# Patient Record
Sex: MALE | Race: WHITE | URBAN - METROPOLITAN AREA
[De-identification: names, ages, dates, MRNs, and addresses within clinical notes are randomized per-mention and may not be internally consistent; named-entity substitution may affect disease eponyms.]

---

## 2023-07-05 ENCOUNTER — OFFICE VISIT (OUTPATIENT)
Dept: PRIMARY CARE | Facility: CLINIC | Age: 63
End: 2023-07-05
Payer: COMMERCIAL

## 2023-07-05 ENCOUNTER — TELEPHONE (OUTPATIENT)
Dept: PRIMARY CARE | Facility: CLINIC | Age: 63
End: 2023-07-05

## 2023-07-05 VITALS
BODY MASS INDEX: 27.47 KG/M2 | HEIGHT: 67 IN | HEART RATE: 65 BPM | WEIGHT: 175 LBS | RESPIRATION RATE: 14 BRPM | DIASTOLIC BLOOD PRESSURE: 78 MMHG | SYSTOLIC BLOOD PRESSURE: 122 MMHG | OXYGEN SATURATION: 96 %

## 2023-07-05 DIAGNOSIS — N52.9 ERECTILE DYSFUNCTION, UNSPECIFIED ERECTILE DYSFUNCTION TYPE: ICD-10-CM

## 2023-07-05 DIAGNOSIS — E78.5 HYPERLIPIDEMIA, UNSPECIFIED HYPERLIPIDEMIA TYPE: ICD-10-CM

## 2023-07-05 DIAGNOSIS — E55.9 MILD VITAMIN D DEFICIENCY: ICD-10-CM

## 2023-07-05 DIAGNOSIS — G47.00 INSOMNIA, UNSPECIFIED TYPE: Primary | ICD-10-CM

## 2023-07-05 DIAGNOSIS — Z00.00 PHYSICAL EXAM: Primary | ICD-10-CM

## 2023-07-05 PROCEDURE — 99396 PREV VISIT EST AGE 40-64: CPT | Performed by: INTERNAL MEDICINE

## 2023-07-05 PROCEDURE — 1036F TOBACCO NON-USER: CPT | Performed by: INTERNAL MEDICINE

## 2023-07-05 RX ORDER — ZOLPIDEM TARTRATE 10 MG/1
10 TABLET ORAL NIGHTLY PRN
Qty: 6 TABLET | Refills: 0 | Status: SHIPPED | OUTPATIENT
Start: 2023-07-05 | End: 2023-07-11

## 2023-07-05 RX ORDER — SILDENAFIL 50 MG/1
50 TABLET, FILM COATED ORAL DAILY PRN
Qty: 12 TABLET | Refills: 3 | Status: SHIPPED | OUTPATIENT
Start: 2023-07-05 | End: 2024-07-04

## 2023-07-05 ASSESSMENT — PATIENT HEALTH QUESTIONNAIRE - PHQ9
1. LITTLE INTEREST OR PLEASURE IN DOING THINGS: NOT AT ALL
SUM OF ALL RESPONSES TO PHQ9 QUESTIONS 1 AND 2: 0
2. FEELING DOWN, DEPRESSED OR HOPELESS: NOT AT ALL

## 2023-07-05 ASSESSMENT — ENCOUNTER SYMPTOMS
MYALGIAS: 0
CHILLS: 0
CONSTIPATION: 0
DIAPHORESIS: 0
SHORTNESS OF BREATH: 0
FEVER: 0
NAUSEA: 0
JOINT SWELLING: 0
COUGH: 0
VOMITING: 0
DIARRHEA: 0

## 2023-07-05 NOTE — PATIENT INSTRUCTIONS
FASTING LABS ARE ORDERED FOR AFTER 7/28/2023    2.  SILDENAFIL TRIAL FOR ED, BEWARE OF THE RARE SIDE EFFECTS OF PRIAPISM OR COLOR VISION ABERRATION    3.  WILL LET ORTHOPEDICS XRAY THE ELBOW, BUT COMMON THINGS BEING COMMON, TRY TO LIMIT THE LENGTH OF YOUR PUSHUP TO AVOID FULL FLEXION AND EXTENSION TO TAKE PRESSURE OFF OF WHERE THE TRICEPS INSERTS    4.  REGULAR EYE EXAMS ARE RECOMMENDED FOR YOU    5.  REGULAR COLONOSCOPIES AS YOU ARE DOING    6.  FOLLOW UP IN 1 YEAR OR AS NEEDED     7.  ZOLPIDEM 10 MG QHS PRN SLEEP #6 NO REFILL SENT IN

## 2023-07-05 NOTE — PROGRESS NOTES
"Subjective   Denis Deleon is a 62 y.o. male who presents for  FOLLOW UP   RIGHT ELBOW PAIN HAS APPT WITH ORTHO 7/26 ? SHOULD HE GET XRAY BEFORE HAND       HPI   WANT TO CONSIDER TRIAL VIAGRA    NOT TRIED BEFORE    RIGHT ELBOW PAIN FOR A FEW MONTHS.      Trouble sleeping before flights, want a few ambien to help slepp  Review of Systems   Constitutional:  Negative for chills, diaphoresis and fever.   Respiratory:  Negative for cough and shortness of breath.    Cardiovascular:  Negative for chest pain and leg swelling.   Gastrointestinal:  Negative for constipation, diarrhea, nausea and vomiting.   Genitourinary:         PER HPI     Musculoskeletal:  Negative for joint swelling and myalgias.       Objective   /78   Pulse 65   Resp 14   Ht 1.702 m (5' 7\")   Wt 79.4 kg (175 lb)   SpO2 96%   BMI 27.41 kg/m²     Physical Exam  Vitals reviewed.   Constitutional:       General: He is not in acute distress.     Appearance: He is not ill-appearing.   Cardiovascular:      Rate and Rhythm: Normal rate and regular rhythm.      Pulses: Normal pulses.      Heart sounds:      No gallop.   Pulmonary:      Breath sounds: Normal breath sounds. No wheezing, rhonchi or rales.   Abdominal:      General: Abdomen is flat. Bowel sounds are normal.      Palpations: Abdomen is soft.      Tenderness: There is no guarding or rebound.   Musculoskeletal:      Right lower leg: No edema.      Left lower leg: No edema.         Assessment/Plan   Problem List Items Addressed This Visit    None  Visit Diagnoses       Physical exam    -  Primary    Relevant Orders    Follow Up In [facility] - PCP - Established    Erectile dysfunction, unspecified erectile dysfunction type        Relevant Medications    sildenafil (Viagra) 50 mg tablet    Other Relevant Orders    Prostate Specific Antigen, Screen    Thyroid Stimulating Hormone    Hyperlipidemia, unspecified hyperlipidemia type        Relevant Orders    Comprehensive Metabolic " Panel    CBC    Lipid Panel    Mild vitamin D deficiency        Relevant Orders    Vitamin D, Total          Patient Instructions    FASTING LABS ARE ORDERED FOR AFTER 7/28/2023    2.  SILDENAFIL TRIAL FOR ED, BEWARE OF THE RARE SIDE EFFECTS OF PRIAPISM OR COLOR VISION ABERRATION    3.  WILL LET ORTHOPEDICS XRAY THE ELBOW, BUT COMMON THINGS BEING COMMON, TRY TO LIMIT THE LENGTH OF YOUR PUSHUP TO AVOID FULL FLEXION AND EXTENSION TO TAKE PRESSURE OFF OF WHERE THE TRICEPS INSERTS    4.  REGULAR EYE EXAMS ARE RECOMMENDED FOR YOU    5.  REGULAR COLONOSCOPIES AS YOU ARE DOING    6.  FOLLOW UP IN 1 YEAR OR AS NEEDED     7.  ZOLPIDEM 10 MG QHS PRN SLEEP #6 NO REFILL SENT IN

## 2023-12-28 ENCOUNTER — OFFICE VISIT (OUTPATIENT)
Dept: ORTHOPEDIC SURGERY | Facility: CLINIC | Age: 63
End: 2023-12-28
Payer: COMMERCIAL

## 2023-12-28 ENCOUNTER — ANCILLARY PROCEDURE (OUTPATIENT)
Dept: RADIOLOGY | Facility: CLINIC | Age: 63
End: 2023-12-28
Payer: COMMERCIAL

## 2023-12-28 DIAGNOSIS — M25.552 HIP PAIN, LEFT: ICD-10-CM

## 2023-12-28 DIAGNOSIS — M67.952 TENDINOPATHY OF LEFT GLUTEUS MEDIUS: Primary | ICD-10-CM

## 2023-12-28 PROCEDURE — 73502 X-RAY EXAM HIP UNI 2-3 VIEWS: CPT | Mod: LEFT SIDE | Performed by: RADIOLOGY

## 2023-12-28 PROCEDURE — 1036F TOBACCO NON-USER: CPT | Performed by: FAMILY MEDICINE

## 2023-12-28 PROCEDURE — 73502 X-RAY EXAM HIP UNI 2-3 VIEWS: CPT | Mod: LT

## 2023-12-28 PROCEDURE — 99204 OFFICE O/P NEW MOD 45 MIN: CPT | Performed by: FAMILY MEDICINE

## 2023-12-28 ASSESSMENT — PAIN DESCRIPTION - DESCRIPTORS: DESCRIPTORS: ACHING;SHARP

## 2023-12-28 ASSESSMENT — PAIN - FUNCTIONAL ASSESSMENT: PAIN_FUNCTIONAL_ASSESSMENT: 0-10

## 2023-12-28 ASSESSMENT — PAIN SCALES - GENERAL: PAINLEVEL_OUTOF10: 3

## 2023-12-28 NOTE — PROGRESS NOTES
NPV L hip pain  Subjective    Patient ID: Denis Deleon is a 63 y.o. male.    Chief Complaint: Pain of the Left Hip  Denis is a very pleasant 63-year-old gentleman who presents with left posterior lateral hip pain that is been going on for about 6 months.  It has been getting worse over the last few weeks.  Walking and weightbearing seem to make it worse.  He has taken some occasional Advil and rest seems to make it better.  It is not interfering with his day-to-day activities.  He has never had an issue with this hip, but has had ACL reconstructive surgery done in the 80s.  He currently rates his pain as a 3 out of 10 at its worst.    Objective   Musculoskeletal: Left hip-there is no deformity or asymmetry when compared to the opposite side.  He has some tenderness to palpation over the glutes minimus and gluteus medius tendons.  Not as much over the greater trochanter.  Logroll is negative.  Good range of motion of the hip without any significant discomfort, but does get some discomfort with end range of internal rotation.  Strength is 5 out of 5 in all directions.  Axial loading does not recreate any discomfort.  He has a very positive Lester's.    Image Results: 3 views of the left hip were reviewed and found no evidence of acute bony abnormalities.  No significant bone spurring.  Mild degenerative change of the femoral acetabular joint.      Assessment/Plan   Encounter Diagnoses:  Tendinopathy of left gluteus medius    Hip pain, left    Orders Placed This Encounter    XR hip left with pelvis when performed 2 or 3 views    Referral to Physical Therapy     Glue medius tendinitis on the left-we are going to start conservatively with formal physical therapy and oral medications.  Would like to give this a good 3 to 4 weeks and if he shows no improvement will consider steroid injection.  He will follow-up in 4 to 6 weeks, sooner if he has any setbacks.  All of his questions were answered he agrees with treatment  plan.

## 2024-01-31 ENCOUNTER — EVALUATION (OUTPATIENT)
Dept: PHYSICAL THERAPY | Facility: CLINIC | Age: 64
End: 2024-01-31
Payer: COMMERCIAL

## 2024-01-31 DIAGNOSIS — M67.952 TENDINOPATHY OF LEFT GLUTEUS MEDIUS: ICD-10-CM

## 2024-01-31 DIAGNOSIS — M25.552 LEFT HIP PAIN: Primary | ICD-10-CM

## 2024-01-31 PROCEDURE — 97161 PT EVAL LOW COMPLEX 20 MIN: CPT | Mod: GP | Performed by: PHYSICAL THERAPIST

## 2024-01-31 PROCEDURE — 97110 THERAPEUTIC EXERCISES: CPT | Mod: GP | Performed by: PHYSICAL THERAPIST

## 2024-01-31 ASSESSMENT — PAIN SCALES - GENERAL: PAINLEVEL_OUTOF10: 0 - NO PAIN

## 2024-01-31 ASSESSMENT — PAIN - FUNCTIONAL ASSESSMENT: PAIN_FUNCTIONAL_ASSESSMENT: 0-10

## 2024-01-31 NOTE — PROGRESS NOTES
Physical Therapy Evaluation    Patient Name: Denis Deleon  MRN: [id number]  Today's Date: 2/2/2024  Time Calculation  Start Time: 1052  Stop Time: 1131  Time Calculation (min): 39 min    Visit #: 1  Visits approved: Auth required.   Certification dates: NA    Precautions:  Precautions  Precautions Comment: None. Low fall risk.    Subjective/History  DOI: 6/1/23.  Mechanism of injury: Insidious.  Area of symptoms: Intermittent sharp or achy pain at left lat hip.  Pain Assessment: 0-10  Pain Score: 0 - No pain  Aggravating factors: Walk 15 min. Stairs.   Easing factors: Ibuprofen.  Red flags: None.  Occupation: Full-time .  Recreation/Hobbies/Sports: Would like to hike up to 2 hrs.  Living situation: Unremarkable. Does stairs safely.  Barriers to treatment: None.   Preferred language: English.    Objective Findings  Observation/gait: Mild bilat genu varum in standing. Gait: wnl.  PROM HF: wnl. Flex/adduction: wnl- mild lat hip pain. IR(90): ~10- stiff.   Muscle activation/Resisted testing: Gute Med: 4+/5- pain. Glute max: 5/5.  Special tests: FADIR: Lat hip pain.  RAMIREZ: Lat hip pain. SLR: ~70- HS stretch. No change with DF. Resisted SLR: Positive for post hip pain with resisted contralateral leg raise.  Palpation: TTP Left glute med, SIJ.    Treatment:   Therex: Glute med stretch in piriformis stretch position- HEP 30 sec, 4x/day. Clam shells: HEP 3x20, every other day. TA hold- HEP throughout day.  Assessment  Patient presents with palpation and resisted test findings consistent with glute med strain. Also has positive RAMIREZ and resisted SLS suggesting possible SIJ component- to be further assessed.     Plan  Physical therapy 1-2 times/week for 6-8 wks. Therapy may include the following: Therapeutic exercise, manual therapy, education/home program.     Goals: Walk 2 miles for exer (prepare for hiking) w/o pain. Up and down 2 flights of stairs for household ambulation w/o pain.  Glute med strength:  5/5.

## 2024-02-08 ENCOUNTER — TREATMENT (OUTPATIENT)
Dept: PHYSICAL THERAPY | Facility: CLINIC | Age: 64
End: 2024-02-08
Payer: COMMERCIAL

## 2024-02-08 DIAGNOSIS — M25.552 LEFT HIP PAIN: ICD-10-CM

## 2024-02-08 PROCEDURE — 97110 THERAPEUTIC EXERCISES: CPT | Mod: GP,CQ

## 2024-02-08 PROCEDURE — 97140 MANUAL THERAPY 1/> REGIONS: CPT | Mod: CQ,GP

## 2024-02-08 ASSESSMENT — PAIN - FUNCTIONAL ASSESSMENT: PAIN_FUNCTIONAL_ASSESSMENT: 0-10

## 2024-02-08 ASSESSMENT — PAIN SCALES - GENERAL: PAINLEVEL_OUTOF10: 2

## 2024-02-08 NOTE — PROGRESS NOTES
"      Physical Therapy Treatment    Patient Name: Denis Deleon  MRN: [id number]  Today's Date: 2/8/2024  Time Calculation  Start Time: 0957  Stop Time: 1042  Time Calculation (min): 45 min  Insurance   Visit #: 2 Visits approved: Auth required.   Certification dates: NA  Current Problem:  1. Left hip pain  Follow Up In Physical Therapy        Subjective:  I was able to hike over the weekend~ 1 hr.  I was sore afterward and discomfort went away with rest and with ibuprofen.  Pain:  Pain Assessment: 0-10  Pain Score: 2    Objective:  L hip SI joint hypo-mobility noted with lumbar flexion  Precautions  Precautions  Precautions Comment: None. Low fall risk.    Treatments:  Therapeutic Exercise 15742: Unit(s)-2  Supine hipflexor -daniel test position x 30sec R/L  Supine gluteus med stretch in fig 4 position x 30sec R/L  SL Clamshell x 10 R/L  Supine TA with March x 10  Supine TA with BKFO  Quadruped TA 1x10  Quadruped TA with Hip Heel Rock x 10 x 10\" hold  SLS with contralateral toe touch with TA x 10 R/L  TG -Leg Press Level 5 B LE 2x10 with isometric hip abd into red band  Standing Runner's stretch x 30sec R/L  Seated Hamstring stretch x 30sec R/L  Manual Therapy 46893: Unit(s)-1  MFR B LE Leg Pull  MFR L  Leg Pull  MFR-Cross Hand Release to L Lumbosacral area  MFR-Cross Hand Release to L Quadriceps, Sartorius and Hipflexor area  Sacral Release x 5  Prone hip Distraction with sacral stabilization on L LE  MFR Cross Hand Release to Lumbosacral region  Supine MET to LE R Hip Ext with L HipFlexion x 10 x 5\" hold  Assessment: Patient experienced discomfort in L hip and back at times during exercises that resolved with rest.   Plan: Continue to progress core strength and endurance.  "

## 2024-02-14 ENCOUNTER — TREATMENT (OUTPATIENT)
Dept: PHYSICAL THERAPY | Facility: CLINIC | Age: 64
End: 2024-02-14
Payer: COMMERCIAL

## 2024-02-14 DIAGNOSIS — M25.552 LEFT HIP PAIN: ICD-10-CM

## 2024-02-14 PROCEDURE — 97110 THERAPEUTIC EXERCISES: CPT | Mod: GP,CQ

## 2024-02-14 ASSESSMENT — PAIN - FUNCTIONAL ASSESSMENT: PAIN_FUNCTIONAL_ASSESSMENT: 0-10

## 2024-02-14 ASSESSMENT — PAIN SCALES - GENERAL: PAINLEVEL_OUTOF10: 4

## 2024-02-14 NOTE — PROGRESS NOTES
"      Physical Therapy Treatment    Patient Name: Denis Deleon  MRN: [id number]  Today's Date: 2/14/2024  Time Calculation  Start Time: 0920  Stop Time: 1004  Time Calculation (min): 44 min  Insurance   Visit #: 3 Visits approved: Auth required.   Certification dates: NA  Current Problem:  1. Left hip pain  Follow Up In Physical Therapy        Subjective:  I felt ok following last session with no increase of symptoms.  Pain:  Pain Assessment: 0-10  Pain Score: 4  Pain Type: Chronic pain  Pain Location: Back    Objective:  SI joint mobility -WNL  Precautions  Precautions  Precautions Comment: None. Low fall risk.    Treatments:  Therapeutic Exercise 75759: Unit(s)-2  Supine SKTC x 10\" x 3 R/L  Supine B UE shoulder flexion to promote T-spine ext 1x10 x 10\" hold  Supine Hipflexor stretch x 30 sec R/L   Supine PPT with March x 10  Supine LTR with contralateral UTR x 10  3 way bridge x5 each  SL Hip abd 1x10 R/L  SL Hip ER 1x10 R/L  SLS with contralateral hip abd/er isometric into wall x 5 x 5\" hold  Bent over raised table in trunk flexion with TA  Alt hip ext 1x10 each  Bird dog 1x10   Manual Therapy 04945: Unit(s)-  Manual Hip flexor stretch in prone with P/A glide to R/L PSIS  MFR-Cross Hand Release to L Lumbosacral area  Assessment: Patient experienced discomfort with prone position that resolved with change of position.  Plan: Continue to progress gluteal strength and function.  "

## 2024-02-21 ENCOUNTER — LAB (OUTPATIENT)
Dept: LAB | Facility: LAB | Age: 64
End: 2024-02-21
Payer: COMMERCIAL

## 2024-02-21 DIAGNOSIS — N52.9 ERECTILE DYSFUNCTION, UNSPECIFIED ERECTILE DYSFUNCTION TYPE: ICD-10-CM

## 2024-02-21 DIAGNOSIS — E78.5 HYPERLIPIDEMIA, UNSPECIFIED HYPERLIPIDEMIA TYPE: ICD-10-CM

## 2024-02-21 LAB
ALBUMIN SERPL BCP-MCNC: 4 G/DL (ref 3.4–5)
ALP SERPL-CCNC: 71 U/L (ref 33–136)
ALT SERPL W P-5'-P-CCNC: 15 U/L (ref 10–52)
ANION GAP SERPL CALC-SCNC: 13 MMOL/L (ref 10–20)
AST SERPL W P-5'-P-CCNC: 16 U/L (ref 9–39)
BILIRUB SERPL-MCNC: 0.7 MG/DL (ref 0–1.2)
BUN SERPL-MCNC: 12 MG/DL (ref 6–23)
CALCIUM SERPL-MCNC: 9 MG/DL (ref 8.6–10.3)
CHLORIDE SERPL-SCNC: 105 MMOL/L (ref 98–107)
CHOLEST SERPL-MCNC: 185 MG/DL (ref 0–199)
CHOLESTEROL/HDL RATIO: 3.8
CO2 SERPL-SCNC: 26 MMOL/L (ref 21–32)
CREAT SERPL-MCNC: 1.02 MG/DL (ref 0.5–1.3)
EGFRCR SERPLBLD CKD-EPI 2021: 83 ML/MIN/1.73M*2
ERYTHROCYTE [DISTWIDTH] IN BLOOD BY AUTOMATED COUNT: 12.9 % (ref 11.5–14.5)
GLUCOSE SERPL-MCNC: 100 MG/DL (ref 74–99)
HCT VFR BLD AUTO: 42.8 % (ref 41–52)
HDLC SERPL-MCNC: 48.8 MG/DL
HGB BLD-MCNC: 14.9 G/DL (ref 13.5–17.5)
LDLC SERPL CALC-MCNC: 117 MG/DL
MCH RBC QN AUTO: 31 PG (ref 26–34)
MCHC RBC AUTO-ENTMCNC: 34.8 G/DL (ref 32–36)
MCV RBC AUTO: 89 FL (ref 80–100)
NON HDL CHOLESTEROL: 136 MG/DL (ref 0–149)
NRBC BLD-RTO: 0 /100 WBCS (ref 0–0)
PLATELET # BLD AUTO: 276 X10*3/UL (ref 150–450)
POTASSIUM SERPL-SCNC: 4.1 MMOL/L (ref 3.5–5.3)
PROT SERPL-MCNC: 6.2 G/DL (ref 6.4–8.2)
PSA SERPL-MCNC: 0.53 NG/ML
RBC # BLD AUTO: 4.8 X10*6/UL (ref 4.5–5.9)
SODIUM SERPL-SCNC: 140 MMOL/L (ref 136–145)
TRIGL SERPL-MCNC: 95 MG/DL (ref 0–149)
TSH SERPL-ACNC: 1.34 MIU/L (ref 0.44–3.98)
VLDL: 19 MG/DL (ref 0–40)
WBC # BLD AUTO: 6 X10*3/UL (ref 4.4–11.3)

## 2024-02-21 PROCEDURE — 36415 COLL VENOUS BLD VENIPUNCTURE: CPT

## 2024-02-21 PROCEDURE — 85027 COMPLETE CBC AUTOMATED: CPT

## 2024-02-21 PROCEDURE — 80053 COMPREHEN METABOLIC PANEL: CPT

## 2024-02-21 PROCEDURE — 84153 ASSAY OF PSA TOTAL: CPT

## 2024-02-21 PROCEDURE — 84443 ASSAY THYROID STIM HORMONE: CPT

## 2024-02-21 PROCEDURE — 80061 LIPID PANEL: CPT

## 2024-03-01 ENCOUNTER — TREATMENT (OUTPATIENT)
Dept: PHYSICAL THERAPY | Facility: CLINIC | Age: 64
End: 2024-03-01
Payer: COMMERCIAL

## 2024-03-01 DIAGNOSIS — M25.552 LEFT HIP PAIN: ICD-10-CM

## 2024-03-01 PROCEDURE — 97140 MANUAL THERAPY 1/> REGIONS: CPT | Mod: GP | Performed by: PHYSICAL THERAPIST

## 2024-03-01 PROCEDURE — 97110 THERAPEUTIC EXERCISES: CPT | Mod: GP | Performed by: PHYSICAL THERAPIST

## 2024-03-01 NOTE — PROGRESS NOTES
Physical Therapy Follow-up    Patient Name: Denis Deleon  MRN: [id number]  Today's Date: 3/1/2024         Visit#: 4. 8 approved.  Cert dates: NA    Precautions: None.    Subjective: Hip pain ~ same. Walking and stairs ~ same. HEP going well.    Objective: PROM HF: wnl. Flex/adduction: wnl- lat hip pain.  AROM LS flex: wnl Ext: wnl- mild LBP.  IV- LS PA: stiff L3-5.    Treatment:   Therapeutic exercise: Lat hip stretch in hooklying, clam shells, Hooklying heel taps- all demo'd correctly.   Bent knee fall-outs- HEP 1 min, 2x/day.   Pelvic tilts- HEP 1 min 2x/day.   2-leg bridge.  Alternating 1-leg bridge- HEP 10x, 2x/day.     Manual therapy: IV PA, bilat transverse L3-5// Hip flex/add: no change.   IV- long hip, SIJ distraction// HF: wnl- decr pain.    Assessment: Decr sxs with hip distraction. Demo's HEP correctly.    Plan: Incr core stabilization exer.

## 2024-03-27 ENCOUNTER — TREATMENT (OUTPATIENT)
Dept: PHYSICAL THERAPY | Facility: CLINIC | Age: 64
End: 2024-03-27
Payer: COMMERCIAL

## 2024-03-27 DIAGNOSIS — M25.552 LEFT HIP PAIN: ICD-10-CM

## 2024-03-27 PROCEDURE — 97110 THERAPEUTIC EXERCISES: CPT | Mod: GP | Performed by: PHYSICAL THERAPIST

## 2024-03-27 PROCEDURE — 97140 MANUAL THERAPY 1/> REGIONS: CPT | Mod: GP | Performed by: PHYSICAL THERAPIST

## 2024-03-27 NOTE — PROGRESS NOTES
Physical Therapy Follow-up    Patient Name: Denis Deleon  MRN: [id number]  Today's Date: 3/27/2024         Visit#: 5. 8 approved.  Cert dates: NA    Precautions: None.    Subjective: Hip pain decreasing. Walking and stairs better, but has incr bilat LBP. HEP going well.    Objective: PROM HF: wnl. Flex/adduction: wnl- lat hip pain.  AROM LS flex: wnl Ext: wnl- mild LBP.  IV- LS PA: stiff L3-5.    Treatment:   Therapeutic exercise: Lat hip stretch in hooklying, clam shells, bent knee fall-outs, pelvic tilts, 2-leg bridge, alternating 1-leg bridge- all demo'd correctly.      Manual therapy: IV PA, bilat transverse L3-5// LS ext: wnl- decr pain.   IV- long hip, SIJ distraction//     Assessment: Decr sxs with rx. Demo's HEP correctly.    Plan: Incr core stabilization exer.

## 2024-04-01 ENCOUNTER — TREATMENT (OUTPATIENT)
Dept: PHYSICAL THERAPY | Facility: CLINIC | Age: 64
End: 2024-04-01
Payer: COMMERCIAL

## 2024-04-01 DIAGNOSIS — M25.552 LEFT HIP PAIN: ICD-10-CM

## 2024-04-01 PROCEDURE — 97140 MANUAL THERAPY 1/> REGIONS: CPT | Mod: GP | Performed by: PHYSICAL THERAPIST

## 2024-04-01 PROCEDURE — 97110 THERAPEUTIC EXERCISES: CPT | Mod: GP | Performed by: PHYSICAL THERAPIST

## 2024-04-01 NOTE — PROGRESS NOTES
Physical Therapy Follow-up    Patient Name: Denis Deleon  MRN: [id number]  Today's Date: 4/1/2024         Visit#: 6. 8 approved.  Cert dates: NA    Precautions: None.    Subjective: Hip pain decreasing. Walking and stairs better overall since last here. HEP going well.    Objective:   AROM LS flex: wnl Ext: wnl. SB: wnl bilat- mild pain with left SB.  IV- LS PA: stiff L3-5.    Treatment:   Therapeutic exercise: Lat hip stretch in hooklying, clam shells, bent knee fall-outs, pelvic tilts, 2-leg bridge, alternating 1-leg bridge- all demo'd correctly.   Sidelying leg lifts- HEP 20x, 2x/day.       Manual therapy: IV PA, bilat transverse L3-5. IV- long hip/SIJ distraction x ~5 min// LS SB: wnl- decr pain.     Assessment: Decr sxs with rx. Demo's HEP correctly.    Plan: Incr core stabilization exer.

## 2024-04-10 ENCOUNTER — TREATMENT (OUTPATIENT)
Dept: PHYSICAL THERAPY | Facility: CLINIC | Age: 64
End: 2024-04-10
Payer: COMMERCIAL

## 2024-04-10 DIAGNOSIS — M25.552 LEFT HIP PAIN: ICD-10-CM

## 2024-04-10 PROCEDURE — 97140 MANUAL THERAPY 1/> REGIONS: CPT | Mod: GP | Performed by: PHYSICAL THERAPIST

## 2024-04-10 PROCEDURE — 97110 THERAPEUTIC EXERCISES: CPT | Mod: GP | Performed by: PHYSICAL THERAPIST

## 2024-04-10 NOTE — PROGRESS NOTES
Physical Therapy Follow-up    Patient Name: Denis Deleon  MRN: [id number]  Today's Date: 4/10/2024         Visit#: 7. 8 approved.  Cert dates: NA    Precautions: None.    Subjective: Hip and back pain decreasing. Walking better- walked 90 min with incr LBP and hip pain for 2 days following. HEP going well.    Objective:   AROM LS flex: wnl Ext: wnl- mild LBP.   IV- LS PA: stiff L3-5.    Treatment:   Therapeutic exercise: Lat hip stretch in hooklying, clam shells, bent knee fall-outs, pelvic tilts, 2-leg bridge, alternating 1-leg bridge, sidelying leg lifts- all demo'd correctly.        Manual therapy: IV PA, bilat transverse L3-5. IV- long hip/SIJ distraction x ~5 min// LS ext: wnl- slight decr pain.     Assessment: Decr sxs with rx. Demo's HEP correctly.    Plan: Incr core stabilization exer.

## 2024-04-17 ENCOUNTER — TREATMENT (OUTPATIENT)
Dept: PHYSICAL THERAPY | Facility: CLINIC | Age: 64
End: 2024-04-17
Payer: COMMERCIAL

## 2024-04-17 DIAGNOSIS — M25.552 LEFT HIP PAIN: ICD-10-CM

## 2024-04-17 PROCEDURE — 97140 MANUAL THERAPY 1/> REGIONS: CPT | Mod: GP | Performed by: PHYSICAL THERAPIST

## 2024-04-17 PROCEDURE — 97110 THERAPEUTIC EXERCISES: CPT | Mod: GP | Performed by: PHYSICAL THERAPIST

## 2024-04-17 NOTE — PROGRESS NOTES
Physical Therapy Follow-up    Patient Name: Denis Deleon  MRN: [id number]  Today's Date: 4/17/2024         Visit#: 8. 8 approved.  Cert dates: NA    Precautions: None.    Subjective: Hip and back pain decreasing. Walking 2 miles with mild incr pain. Stairs wnl. HEP going well. Thinks he is ready for self management.    Objective:   AROM LS flex: wnl Ext: wnl. SB: wnl bilat- pain with left SB.   IV- LS PA: stiff L3-5.    Treatment:   Therapeutic exercise: Lat hip stretch in hooklying, clam shells, bent knee fall-outs, pelvic tilts, 2-leg bridge, alternating 1-leg bridge, sidelying leg lifts- all demo'd correctly.        Manual therapy: IV PA, bilat transverse L3-5. IV- long hip/SIJ distraction x ~5 min// LS ext: wnl- slight decr pain.     Assessment: all goals nearly achieved. Ready for self management.    Plan: Discharge with HEP.

## 2024-07-10 ENCOUNTER — APPOINTMENT (OUTPATIENT)
Dept: PRIMARY CARE | Facility: CLINIC | Age: 64
End: 2024-07-10
Payer: COMMERCIAL

## 2025-02-03 ENCOUNTER — TELEPHONE (OUTPATIENT)
Dept: PRIMARY CARE | Facility: CLINIC | Age: 65
End: 2025-02-03
Payer: COMMERCIAL

## 2025-02-03 NOTE — TELEPHONE ENCOUNTER
Pt has dx of Neuralgia Parasitica.  He has constant itching on outer right thigh.    Pt asking for a referral to see Neurologist Jagjit mcpherson.

## 2025-02-16 ASSESSMENT — PROMIS GLOBAL HEALTH SCALE
RATE_AVERAGE_PAIN: 3
RATE_AVERAGE_FATIGUE: MODERATE
RATE_SOCIAL_SATISFACTION: VERY GOOD
EMOTIONAL_PROBLEMS: RARELY
RATE_QUALITY_OF_LIFE: VERY GOOD
CARRYOUT_SOCIAL_ACTIVITIES: GOOD
RATE_PHYSICAL_HEALTH: GOOD
CARRYOUT_PHYSICAL_ACTIVITIES: MODERATELY
RATE_MENTAL_HEALTH: VERY GOOD
RATE_GENERAL_HEALTH: VERY GOOD

## 2025-02-17 DIAGNOSIS — G57.10 MERALGIA PARESTHETICA, UNSPECIFIED LATERALITY: Primary | ICD-10-CM

## 2025-02-20 ENCOUNTER — APPOINTMENT (OUTPATIENT)
Dept: PRIMARY CARE | Facility: CLINIC | Age: 65
End: 2025-02-20
Payer: COMMERCIAL

## 2025-02-20 VITALS
RESPIRATION RATE: 14 BRPM | DIASTOLIC BLOOD PRESSURE: 80 MMHG | BODY MASS INDEX: 25.58 KG/M2 | WEIGHT: 163 LBS | SYSTOLIC BLOOD PRESSURE: 110 MMHG | HEIGHT: 67 IN | HEART RATE: 70 BPM | OXYGEN SATURATION: 96 %

## 2025-02-20 DIAGNOSIS — E55.9 MILD VITAMIN D DEFICIENCY: ICD-10-CM

## 2025-02-20 DIAGNOSIS — N40.0 PROSTATISM: ICD-10-CM

## 2025-02-20 DIAGNOSIS — G47.00 INSOMNIA, UNSPECIFIED TYPE: ICD-10-CM

## 2025-02-20 DIAGNOSIS — D22.9 ATYPICAL MOLE: ICD-10-CM

## 2025-02-20 DIAGNOSIS — E78.5 DYSLIPIDEMIA: ICD-10-CM

## 2025-02-20 DIAGNOSIS — Z12.11 COLON CANCER SCREENING: ICD-10-CM

## 2025-02-20 DIAGNOSIS — Z00.00 ANNUAL PHYSICAL EXAM: Primary | ICD-10-CM

## 2025-02-20 PROCEDURE — 3008F BODY MASS INDEX DOCD: CPT | Performed by: INTERNAL MEDICINE

## 2025-02-20 PROCEDURE — 99396 PREV VISIT EST AGE 40-64: CPT | Performed by: INTERNAL MEDICINE

## 2025-02-20 PROCEDURE — 1036F TOBACCO NON-USER: CPT | Performed by: INTERNAL MEDICINE

## 2025-02-20 PROCEDURE — 99212 OFFICE O/P EST SF 10 MIN: CPT | Performed by: INTERNAL MEDICINE

## 2025-02-20 RX ORDER — TAMSULOSIN HYDROCHLORIDE 0.4 MG/1
0.4 CAPSULE ORAL DAILY
Qty: 30 CAPSULE | Refills: 11 | Status: SHIPPED | OUTPATIENT
Start: 2025-02-20 | End: 2026-02-20

## 2025-02-20 RX ORDER — ZOLPIDEM TARTRATE 10 MG/1
10 TABLET ORAL NIGHTLY PRN
Qty: 6 TABLET | Refills: 0 | Status: SHIPPED | OUTPATIENT
Start: 2025-02-20 | End: 2025-02-26

## 2025-02-20 ASSESSMENT — PATIENT HEALTH QUESTIONNAIRE - PHQ9
2. FEELING DOWN, DEPRESSED OR HOPELESS: NOT AT ALL
SUM OF ALL RESPONSES TO PHQ9 QUESTIONS 1 AND 2: 0
1. LITTLE INTEREST OR PLEASURE IN DOING THINGS: NOT AT ALL

## 2025-02-20 ASSESSMENT — ENCOUNTER SYMPTOMS
DIARRHEA: 0
CHILLS: 0
MYALGIAS: 0
COUGH: 0
JOINT SWELLING: 0
VOMITING: 0
FEVER: 0
SHORTNESS OF BREATH: 0
DIAPHORESIS: 0
CONSTIPATION: 0
NAUSEA: 0

## 2025-02-20 NOTE — PROGRESS NOTES
Subjective   Denis Deleon is a 64 y.o. male and is here for a comprehensive physical exam. The patient reports  EYE EXAM LAST A YEAR AGO.   .   HAS A BIOMETRICS FORM TO BE FILLED OUT.  WAS GIVEN AN ENZYME FROM [facility]IROPRACTER.   [place]TE .  EXERCISE 3 TIMES A WEEK WORK OUT.  SOME CARDIO, EXERCISES.  HAVING TO PUSH TO GET URINE TO FLOW AT NIGHT    Do you take any herbs or supplements that were not prescribed by a doctor? no  Are you taking calcium supplements? no  Are you taking aspirin daily? no      History:  URINARY FREQUENCY AND MILD ERECTIION ISSUES    Do you have pain that bothers you in your daily life? yes LOWER BACK, SAW CHIROPRACTER IN THE BACK, LOST WEIGHT AND DO STRETCHES  Review of Systems   Constitutional:  Negative for chills, diaphoresis and fever.   Respiratory:  Negative for cough and shortness of breath.    Cardiovascular:  Negative for chest pain and leg swelling.   Gastrointestinal:  Negative for constipation, diarrhea, nausea and vomiting.   Genitourinary:         PER HPI     Musculoskeletal:  Negative for joint swelling and myalgias.        Objective   Physical Exam  Vitals reviewed.   Constitutional:       General: He is not in acute distress.     Appearance: He is not ill-appearing.   Cardiovascular:      Rate and Rhythm: Normal rate and regular rhythm.      Pulses: Normal pulses.      Heart sounds:      No gallop.   Pulmonary:      Breath sounds: Normal breath sounds. No wheezing, rhonchi or rales.   Abdominal:      General: Abdomen is flat. Bowel sounds are normal.      Palpations: Abdomen is soft.      Tenderness: There is no guarding or rebound.   Musculoskeletal:      Right lower leg: No edema.      Left lower leg: No edema.   Skin:     General: Skin is warm and dry.      Findings: Lesion (SMALL 4-6 MM FLAT BICOLORED BROWN MACULE WITH VERY DARK SATELLITE LEFT MID BACK) present.         Assessment/Plan   Healthy male exam.      1. SEE PATIENT INSTRUCTION  2. Patient  Counseling:  --Nutrition: Stressed importance of moderation in sodium/caffeine intake, saturated fat and cholesterol, caloric balance, sufficient intake of fresh fruits, vegetables, fiber, calcium, iron, and 1 mg of folate supplement per day (for females capable of pregnancy).  --Discussed the issue of estrogen replacement, calcium supplement, and the daily use of baby aspirin.  --Exercise: Stressed the importance of regular exercise.   --Substance Abuse: Discussed cessation/primary prevention of tobacco, alcohol, or other drug use; driving or other dangerous activities under the influence; availability of treatment for abuse.    --Sexuality: Discussed sexually transmitted diseases, partner selection, use of condoms, avoidance of unintended pregnancy  and contraceptive alternatives.   --Injury prevention: Discussed safety belts, safety helmets, smoke detector, smoking near bedding or upholstery.   --Dental health: Discussed importance of regular tooth brushing, flossing, and dental visits.  --Immunizations reviewed.  --Discussed benefits of screening colonoscopy.  --After hours service discussed with patient  3. Discussed the patient's BMI with him.  The BMI is in the acceptable range.  4. Follow up in one year  Patient Instructions    RECOMMEND YOU GET TETANUS/PERTUSSIS IMMUNIZATION BOOSTER AT THE LOCAL PHARMACY    2.  FASTING LABS ARE ORDERED FOR YOU TO INCLUDE PROTEIN    3.  TRIAL OF TAMSULOSIN/FLOMAX NIGHTLY TO HELP EASE URINE FLOW    4.  REGULAR EYE EXAMS ARE RECOMMENDED FOR [facility]    5.  COLONOSCOPY IS ORDERED FOR YOU    6.  REFILL AMBIEN SENT IN FOR YOU FOR 6 DAYS    7.  DERMATOLOGY REFERRAL TO LOOK AT THE MOLE ON THE BACK    8.  FOLLOW UP YEARLY OR AS NEEDED.

## 2025-02-20 NOTE — PATIENT INSTRUCTIONS
RECOMMEND YOU GET TETANUS/PERTUSSIS IMMUNIZATION BOOSTER AT THE LOCAL PHARMACY    2.  FASTING LABS ARE ORDERED FOR YOU TO INCLUDE PROTEIN    3.  TRIAL OF TAMSULOSIN/FLOMAX NIGHTLY TO HELP EASE URINE FLOW    4.  REGULAR EYE EXAMS ARE RECOMMENDED FOR [facility]    5.  COLONOSCOPY IS ORDERED FOR YOU    6.  REFILL AMBIEN SENT IN FOR YOU FOR 6 DAYS    7.  DERMATOLOGY REFERRAL TO LOOK AT THE MOLE ON THE BACK    8.  FOLLOW UP YEARLY OR AS NEEDED.

## 2025-02-21 DIAGNOSIS — Z12.11 COLON CANCER SCREENING: Primary | ICD-10-CM

## 2025-02-22 LAB
25(OH)D3+25(OH)D2 SERPL-MCNC: 31 NG/ML (ref 30–100)
ALBUMIN SERPL-MCNC: 4.5 G/DL (ref 3.6–5.1)
ALP SERPL-CCNC: 63 U/L (ref 35–144)
ALT SERPL-CCNC: 15 U/L (ref 9–46)
ANION GAP SERPL CALCULATED.4IONS-SCNC: 7 MMOL/L (CALC) (ref 7–17)
AST SERPL-CCNC: 17 U/L (ref 10–35)
BILIRUB SERPL-MCNC: 0.8 MG/DL (ref 0.2–1.2)
BUN SERPL-MCNC: 17 MG/DL (ref 7–25)
CALCIUM SERPL-MCNC: 9.4 MG/DL (ref 8.6–10.3)
CHLORIDE SERPL-SCNC: 104 MMOL/L (ref 98–110)
CHOLEST SERPL-MCNC: 207 MG/DL
CHOLEST/HDLC SERPL: 3.2 (CALC)
CO2 SERPL-SCNC: 28 MMOL/L (ref 20–32)
CREAT SERPL-MCNC: 1.07 MG/DL (ref 0.7–1.35)
EGFRCR SERPLBLD CKD-EPI 2021: 77 ML/MIN/1.73M2
ERYTHROCYTE [DISTWIDTH] IN BLOOD BY AUTOMATED COUNT: 12.5 % (ref 11–15)
GLUCOSE SERPL-MCNC: 108 MG/DL (ref 65–99)
HCT VFR BLD AUTO: 44.2 % (ref 38.5–50)
HDLC SERPL-MCNC: 65 MG/DL
HGB BLD-MCNC: 14.7 G/DL (ref 13.2–17.1)
LDLC SERPL CALC-MCNC: 125 MG/DL (CALC)
MCH RBC QN AUTO: 30.4 PG (ref 27–33)
MCHC RBC AUTO-ENTMCNC: 33.3 G/DL (ref 32–36)
MCV RBC AUTO: 91.5 FL (ref 80–100)
NONHDLC SERPL-MCNC: 142 MG/DL (CALC)
PLATELET # BLD AUTO: 272 THOUSAND/UL (ref 140–400)
PMV BLD REES-ECKER: 9.5 FL (ref 7.5–12.5)
POTASSIUM SERPL-SCNC: 4.7 MMOL/L (ref 3.5–5.3)
PROT SERPL-MCNC: 6.4 G/DL (ref 6.1–8.1)
RBC # BLD AUTO: 4.83 MILLION/UL (ref 4.2–5.8)
SODIUM SERPL-SCNC: 139 MMOL/L (ref 135–146)
TRIGL SERPL-MCNC: 76 MG/DL
TSH SERPL-ACNC: 1.25 MIU/L (ref 0.4–4.5)
WBC # BLD AUTO: 4.5 THOUSAND/UL (ref 3.8–10.8)

## 2025-02-24 RX ORDER — SODIUM, POTASSIUM,MAG SULFATES 17.5-3.13G
SOLUTION, RECONSTITUTED, ORAL ORAL
Qty: 354 ML | Refills: 0 | Status: SHIPPED | OUTPATIENT
Start: 2025-02-24

## 2025-02-25 LAB
25(OH)D3+25(OH)D2 SERPL-MCNC: 31 NG/ML (ref 30–100)
ALBUMIN SERPL-MCNC: 4.5 G/DL (ref 3.6–5.1)
ALP SERPL-CCNC: 63 U/L (ref 35–144)
ALT SERPL-CCNC: 15 U/L (ref 9–46)
ANION GAP SERPL CALCULATED.4IONS-SCNC: 7 MMOL/L (CALC) (ref 7–17)
AST SERPL-CCNC: 17 U/L (ref 10–35)
BILIRUB SERPL-MCNC: 0.8 MG/DL (ref 0.2–1.2)
BUN SERPL-MCNC: 17 MG/DL (ref 7–25)
CALCIUM SERPL-MCNC: 9.4 MG/DL (ref 8.6–10.3)
CHLORIDE SERPL-SCNC: 104 MMOL/L (ref 98–110)
CHOLEST SERPL-MCNC: 207 MG/DL
CHOLEST/HDLC SERPL: 3.2 (CALC)
CO2 SERPL-SCNC: 28 MMOL/L (ref 20–32)
CREAT SERPL-MCNC: 1.07 MG/DL (ref 0.7–1.35)
EGFRCR SERPLBLD CKD-EPI 2021: 77 ML/MIN/1.73M2
ERYTHROCYTE [DISTWIDTH] IN BLOOD BY AUTOMATED COUNT: 12.5 % (ref 11–15)
GLUCOSE SERPL-MCNC: 108 MG/DL (ref 65–99)
HCT VFR BLD AUTO: 44.2 % (ref 38.5–50)
HDLC SERPL-MCNC: 65 MG/DL
HGB BLD-MCNC: 14.7 G/DL (ref 13.2–17.1)
LDLC SERPL CALC-MCNC: 125 MG/DL (CALC)
MCH RBC QN AUTO: 30.4 PG (ref 27–33)
MCHC RBC AUTO-ENTMCNC: 33.3 G/DL (ref 32–36)
MCV RBC AUTO: 91.5 FL (ref 80–100)
NONHDLC SERPL-MCNC: 142 MG/DL (CALC)
PLATELET # BLD AUTO: 272 THOUSAND/UL (ref 140–400)
PMV BLD REES-ECKER: 9.5 FL (ref 7.5–12.5)
POTASSIUM SERPL-SCNC: 4.7 MMOL/L (ref 3.5–5.3)
PROT SERPL-MCNC: 6.4 G/DL (ref 6.1–8.1)
PSA SERPL-MCNC: 0.44 NG/ML
RBC # BLD AUTO: 4.83 MILLION/UL (ref 4.2–5.8)
SODIUM SERPL-SCNC: 139 MMOL/L (ref 135–146)
TRIGL SERPL-MCNC: 76 MG/DL
TSH SERPL-ACNC: 1.25 MIU/L (ref 0.4–4.5)
WBC # BLD AUTO: 4.5 THOUSAND/UL (ref 3.8–10.8)

## 2025-03-18 ENCOUNTER — APPOINTMENT (OUTPATIENT)
Facility: CLINIC | Age: 65
End: 2025-03-18
Payer: COMMERCIAL

## 2025-03-18 VITALS
SYSTOLIC BLOOD PRESSURE: 118 MMHG | RESPIRATION RATE: 17 BRPM | HEIGHT: 67 IN | BODY MASS INDEX: 25.11 KG/M2 | DIASTOLIC BLOOD PRESSURE: 76 MMHG | WEIGHT: 160 LBS | HEART RATE: 64 BPM

## 2025-03-18 DIAGNOSIS — G57.10 MERALGIA PARESTHETICA, UNSPECIFIED LATERALITY: ICD-10-CM

## 2025-03-18 PROCEDURE — 99204 OFFICE O/P NEW MOD 45 MIN: CPT | Performed by: PAIN MEDICINE

## 2025-03-18 PROCEDURE — 1036F TOBACCO NON-USER: CPT | Performed by: PAIN MEDICINE

## 2025-03-18 PROCEDURE — 3008F BODY MASS INDEX DOCD: CPT | Performed by: PAIN MEDICINE

## 2025-03-18 ASSESSMENT — PAIN SCALES - GENERAL: PAINLEVEL_OUTOF10: 0-NO PAIN

## 2025-03-18 ASSESSMENT — PAIN - FUNCTIONAL ASSESSMENT: PAIN_FUNCTIONAL_ASSESSMENT: NO/DENIES PAIN

## 2025-03-18 NOTE — PROGRESS NOTES
Subjective   Patient ID: Denis Deleon is a 64 y.o. male with a past medical history of right-sided meralgia paresthetica    HPI:   Denis is a new patient here for evaluation of right-sided lateral thigh itchiness for the past 10 years.  He did not endorse any inciting event.  The itching is constant, he has tried physical therapy, gabapentin, capsaicin cream with only minimal relief.  The capsaicin cream relieves itchiness partially for 1 to 2 hours.  He has never tried injections for this nerve before.  It is very bothersome in his life it interferes with his ability to enjoy his ADLs.      Review of Systems   13-point ROS done and negative except for HPI.     Current Outpatient Medications   Medication Instructions    sodium,potassium,mag sulfates (Suprep) 17.5-3.13-1.6 gram solution Take one bottle twice as directed by the prep instructions    tamsulosin (FLOMAX) 0.4 mg, oral, Daily    zolpidem (AMBIEN) 10 mg, oral, Nightly PRN, PRN WITH TRAVEL       Past Medical History:   Diagnosis Date    Non-celiac gluten sensitivity     Gluten intolerance        No past surgical history on file.     No family history on file.     Allergies   Allergen Reactions    Gluten Diarrhea    Latex Unknown    Milk Containing Products (Dairy) Diarrhea        Objective     Vitals:    03/18/25 1304   BP: 118/76   Pulse: 64   Resp: 17        Physical Exam  General: NAD, well groomed, well nourished  Eyes: Non-icteric sclera, EOMI  Ears, Nose, Mouth, and Throat: External ears and nose appear to be without deformity or rash. No lesions or masses noted. Hearing is grossly intact.   Neck: Trachea midline  Respiratory: Nonlabored breathing   Cardiovascular: no peripheral edema   Skin: No rashes or open lesions/ulcers identified on skin.    Hip: No pain over greater trochanters. and Pain not reproduced with hip internal/external rotation.     Neurologic:   Cranial nerves grossly intact.   Strength 5/5 and symmetric plantar/dorsiflexion    Sensation: Normal to light touch throughout, pinprick  intact throughout.    Psychiatric: Alert, orientation to person, place, and time. Cooperative.    Imaging personally reviewed and independently interpreted:        Assessment/Plan   Denis is 54-year-old male with clinical picture consistent with right-sided meralgia paresthetica.  He has failed conservative management with physical therapy, gabapentin/capsaicin cream.  He is wishing to pursue interventional management.  Will trial right-sided lateral femoral cutaneous nerve block for diagnostic and/or therapeutic purposes.  If successful, could consider right-sided lateral femoral cutaneous nerve block under ultrasound guidance versus radiofrequency ablation in the future. If unsuccessful, will look for other pathologies.     Plan:  -In office right sided lateral femoral cutaneous nerve block with 0.25% percent bupivacaine  -Denis will call us with results later this afternoon.     The patient has failed treatment with : Physical therapy , three or more classes of medications, have significant limitations in their sleep quality due to the pain, have significant limitations of their quality of life due to the pain, have significant impairments of their activities of daily living (ADLs) due to the pain, and have significant impairments of their instrumental activities of daily living (IADLs) due to the pain    We discussed  the risks, benefits and alternatives of the procedure including but not limited to: , Neuritis/sunburn sensation, Lack of efficacy , Transiently worsening pain , Bleeding, Infection , and Nerve Damage    Follow up: As needed     The patient was invited to contact us back anytime with any questions or concerns and follow-up with us in the office as needed.     Diagnoses and all orders for this visit:  Meralgia paresthetica, unspecified laterality  -     Referral to Pain Medicine      This note was generated with the aid of dictation  software, there may be typos despite my attempts at proofreading.    Franky Louis, DO  Pain Fellow

## 2025-03-19 ENCOUNTER — ANESTHESIA EVENT (OUTPATIENT)
Dept: GASTROENTEROLOGY | Facility: EXTERNAL LOCATION | Age: 65
End: 2025-03-19

## 2025-03-20 ENCOUNTER — APPOINTMENT (OUTPATIENT)
Dept: PRIMARY CARE | Facility: CLINIC | Age: 65
End: 2025-03-20
Payer: COMMERCIAL

## 2025-03-25 ENCOUNTER — APPOINTMENT (OUTPATIENT)
Dept: GASTROENTEROLOGY | Facility: EXTERNAL LOCATION | Age: 65
End: 2025-03-25
Payer: COMMERCIAL

## 2025-04-01 ENCOUNTER — APPOINTMENT (OUTPATIENT)
Facility: CLINIC | Age: 65
End: 2025-04-01
Payer: COMMERCIAL

## 2025-04-01 VITALS
HEART RATE: 81 BPM | BODY MASS INDEX: 26.06 KG/M2 | DIASTOLIC BLOOD PRESSURE: 80 MMHG | WEIGHT: 166 LBS | HEIGHT: 67 IN | SYSTOLIC BLOOD PRESSURE: 150 MMHG

## 2025-04-01 DIAGNOSIS — G57.10 MERALGIA PARESTHETICA, UNSPECIFIED LATERALITY: Primary | ICD-10-CM

## 2025-04-01 PROCEDURE — 3008F BODY MASS INDEX DOCD: CPT | Performed by: PAIN MEDICINE

## 2025-04-01 PROCEDURE — 99214 OFFICE O/P EST MOD 30 MIN: CPT | Performed by: PAIN MEDICINE

## 2025-04-01 ASSESSMENT — PAIN SCALES - GENERAL: PAINLEVEL_OUTOF10: 3

## 2025-04-01 ASSESSMENT — PAIN - FUNCTIONAL ASSESSMENT: PAIN_FUNCTIONAL_ASSESSMENT: 0-10

## 2025-04-01 NOTE — PROGRESS NOTES
Subjective   Patient ID: Denis Deleon is a 64 y.o. male with a past medical history of R-sided meralgia paresthetica     HPI:   Mr. Deleon is a 64yM with a PMH significant for meralgia paresthetica presenting in clinic for repeat R lateral femoral cutaneous nerve block under ultrasound. Patient states he developed R leg itching in the R lateral femoral cutaneous nerve distribution approximately 12 years ago. Topical agents with the exception of topical capsaicin gel were ineffective; topical capsaicin is effective for several hours but must be reapplied multiple times per day so the patient is seeking additional treatment. Prior landmark injection of the R lateral femoral cutaneous nerve was ineffective and performed with a reduced concentration of bupivacaine (0.25%); patient states he has a known high tolerance to local anesthetics.    Classes of medications tried in the past: Topical agents    Review of Systems   13-point ROS done and negative except for HPI.     Current Outpatient Medications   Medication Instructions    sodium,potassium,mag sulfates (Suprep) 17.5-3.13-1.6 gram solution Take one bottle twice as directed by the prep instructions    tamsulosin (FLOMAX) 0.4 mg, oral, Daily    zolpidem (AMBIEN) 10 mg, oral, Nightly PRN, PRN WITH TRAVEL       Past Medical History:   Diagnosis Date    Non-celiac gluten sensitivity     Gluten intolerance        No past surgical history on file.     No family history on file.     Allergies   Allergen Reactions    Gluten Diarrhea    Latex Unknown    Milk Containing Products (Dairy) Diarrhea        Objective     Vitals:    04/01/25 1300   BP: 150/80   Pulse: 81        Physical Exam  General: NAD, well groomed, well nourished  Eyes: Non-icteric sclera, EOMI  Ears, Nose, Mouth, and Throat: External ears and nose appear to be without deformity or rash. No lesions or masses noted. Hearing is grossly intact.   Neck: Trachea midline  Respiratory: Nonlabored breathing    Cardiovascular: trace peripheral edema   Skin: No rashes or open lesions/ulcers identified on skin.    Neurologic:   Cranial nerves grossly intact.   Strength 5/5 and symmetric plantar/dorsiflexion   Sensation: Normal to light touch throughout, pinprick intact throughout.  DTRs:normal and symmetric throughout  Braun: absent  Clonus: absent    Psychiatric: Alert, orientation to person, place, and time. Cooperative.    Imaging personally reviewed and independently interpreted: none    Assessment/Plan   Mr. Morin is a 64yM presenting with R meralgia paresthetica manifesting as itchiness. Based on lack of prior response to in-office injection, attempting again with ultrasound guidance will result in a higher probability of relief. Patient's response to topical capsaicin indicates the paresthesia is mediated by nerve function, ultimately making cutaneous nerve block the optimal choice regardless of guidance modality.     Plan:  -In office R lateral femoral cutaneous nerve block under ultrasound guidance.   - The patient's R ASIS was identified by palpation and the approximate location of the lateral femoral cutaneous nerve was marked. Under ultrasound, the location was scanned, taking care to identify the great vessels and lateral femoral cutaneous nerve. Once the nerve was identified, the area was prepped with chlorhexidine and the ultrasound probe was sterilely prepared in the usual fashion. The area was scanned again and once the nerve was re-identified, using sterile technique a 25ga Quincke needle was advanced and 10cc of 0.75% bupivacaine was deposited in divided fractions with negative aspiration between each fraction. Patient was monitored for 15 minutes after injection and evaluated for improvement in sx. Patient reported resolution of itching and was discharged.    The patient has failed treatment with : injections    We discussed  the risks, benefits and alternatives of the procedure including but not  limited to: , Lack of efficacy , Transiently worsening pain , Bleeding, Infection , and Nerve Damage    Follow up: As needed     The patient was invited to contact us back anytime with any questions or concerns and follow-up with us in the office as needed.     There are no diagnoses linked to this encounter.    Marcin Banerjee MD  Anesthesiology, PGY-2/CA-1  [facility]    This note was generated with the aid of dictation software, there may be typos despite my attempts at proofreading.

## 2025-04-03 ENCOUNTER — APPOINTMENT (OUTPATIENT)
Dept: GASTROENTEROLOGY | Facility: EXTERNAL LOCATION | Age: 65
End: 2025-04-03
Payer: COMMERCIAL

## 2025-04-03 ENCOUNTER — ANESTHESIA (OUTPATIENT)
Dept: GASTROENTEROLOGY | Facility: EXTERNAL LOCATION | Age: 65
End: 2025-04-03

## 2025-04-03 VITALS
TEMPERATURE: 98.1 F | SYSTOLIC BLOOD PRESSURE: 114 MMHG | HEART RATE: 56 BPM | DIASTOLIC BLOOD PRESSURE: 80 MMHG | WEIGHT: 158 LBS | OXYGEN SATURATION: 98 % | RESPIRATION RATE: 16 BRPM | BODY MASS INDEX: 24.8 KG/M2 | HEIGHT: 67 IN

## 2025-04-03 DIAGNOSIS — K63.5 POLYP OF COLON, UNSPECIFIED PART OF COLON, UNSPECIFIED TYPE: ICD-10-CM

## 2025-04-03 DIAGNOSIS — Z12.11 COLON CANCER SCREENING: ICD-10-CM

## 2025-04-03 PROCEDURE — 45385 COLONOSCOPY W/LESION REMOVAL: CPT | Performed by: INTERNAL MEDICINE

## 2025-04-03 RX ORDER — PROPOFOL 10 MG/ML
INJECTION, EMULSION INTRAVENOUS AS NEEDED
Status: DISCONTINUED | OUTPATIENT
Start: 2025-04-03 | End: 2025-04-03

## 2025-04-03 RX ORDER — SODIUM CHLORIDE 9 MG/ML
INJECTION, SOLUTION INTRAVENOUS CONTINUOUS PRN
Status: DISCONTINUED | OUTPATIENT
Start: 2025-04-03 | End: 2025-04-03

## 2025-04-03 RX ORDER — LIDOCAINE HYDROCHLORIDE 20 MG/ML
INJECTION, SOLUTION INFILTRATION; PERINEURAL AS NEEDED
Status: DISCONTINUED | OUTPATIENT
Start: 2025-04-03 | End: 2025-04-03

## 2025-04-03 RX ADMIN — PROPOFOL 100 MG: 10 INJECTION, EMULSION INTRAVENOUS at 08:10

## 2025-04-03 RX ADMIN — PROPOFOL 50 MG: 10 INJECTION, EMULSION INTRAVENOUS at 08:19

## 2025-04-03 RX ADMIN — PROPOFOL 50 MG: 10 INJECTION, EMULSION INTRAVENOUS at 08:14

## 2025-04-03 RX ADMIN — PROPOFOL 50 MG: 10 INJECTION, EMULSION INTRAVENOUS at 08:12

## 2025-04-03 RX ADMIN — PROPOFOL 50 MG: 10 INJECTION, EMULSION INTRAVENOUS at 08:16

## 2025-04-03 RX ADMIN — SODIUM CHLORIDE: 9 INJECTION, SOLUTION INTRAVENOUS at 08:10

## 2025-04-03 RX ADMIN — LIDOCAINE HYDROCHLORIDE 2.5 ML: 20 INJECTION, SOLUTION INFILTRATION; PERINEURAL at 08:10

## 2025-04-03 SDOH — HEALTH STABILITY: MENTAL HEALTH: CURRENT SMOKER: 0

## 2025-04-03 ASSESSMENT — PAIN SCALES - GENERAL
PAINLEVEL_OUTOF10: 0 - NO PAIN
PAIN_LEVEL: 0
PAINLEVEL_OUTOF10: 0 - NO PAIN

## 2025-04-03 ASSESSMENT — COLUMBIA-SUICIDE SEVERITY RATING SCALE - C-SSRS
6. HAVE YOU EVER DONE ANYTHING, STARTED TO DO ANYTHING, OR PREPARED TO DO ANYTHING TO END YOUR LIFE?: NO
1. IN THE PAST MONTH, HAVE YOU WISHED YOU WERE DEAD OR WISHED YOU COULD GO TO SLEEP AND NOT WAKE UP?: NO
2. HAVE YOU ACTUALLY HAD ANY THOUGHTS OF KILLING YOURSELF?: NO

## 2025-04-03 ASSESSMENT — PAIN - FUNCTIONAL ASSESSMENT
PAIN_FUNCTIONAL_ASSESSMENT: 0-10

## 2025-04-03 NOTE — H&P
Procedure H&P    Patient Profile-Procedures  Name Denis Deleon  Date of Birth 1960  MRN [id number]  Address   [address]  [facility] [id number]  [facility] [address]    Primary Phone Number [phone]  Secondary Phone Number    Candido Wild    Procedure(s):  Procedures: Colonoscopy  Primary contact name and number   Extended Emergency Contact Information  Primary Emergency Contact: Fela Deleon   [place]  Mobile Phone: [phone]  Relation: Spouse    General Health  Weight   Vitals:    04/03/25 0737   Weight: 71.7 kg (158 lb)     BMI Body mass index is 24.75 kg/m².    Allergies  Allergies   Allergen Reactions    Gluten Diarrhea    Latex Unknown    Milk Containing Products (Dairy) Diarrhea       Past Medical History   Past Medical History:   Diagnosis Date    Non-celiac gluten sensitivity     Gluten intolerance       Provider assessment  Diagnosis: Colon Cancer Screening/Surveillance   Medication Reviewed - yes  Prior to Admission medications    Medication Sig Start Date End Date Taking? Authorizing Provider   sodium,potassium,mag sulfates (Suprep) 17.5-3.13-1.6 gram solution Take one bottle twice as directed by the prep instructions 2/24/25 4/3/25 Yes Broderick Tam MD   tamsulosin (Flomax) 0.4 mg 24 hr capsule Take 1 capsule (0.4 mg) by mouth once daily. 2/20/25 2/20/26  Candido Gray MD   zolpidem (Ambien) 10 mg tablet Take 1 tablet (10 mg) by mouth as needed at bedtime for sleep for up to 6 days. PRN WITH TRAVEL 2/20/25 2/26/25  Candido Gray MD       Physical Exam  Vitals:    04/03/25 0737   BP: 127/88   Pulse: 68   Resp: 16   Temp: 36.6 °C (97.9 °F)   SpO2: 97%        General: A&Ox3, NAD.  HEENT: AT/NC.   CV: RRR. No murmur.  Resp: CTA bilaterally. No wheezing, rhonchi or rales.   GI: Soft, NT/ND. BSx4.  Extrem: No edema. Pulses intact.  Neuro: No focal deficits.   Psych: Normal mood and affect.      Procedure Plan - pre-procedural  (re)assesment completed by physician:  discharge/transfer patient when discharge criteria met    ASA status 2  Mallampati score 2    Broderick Tam MD  4/3/2025 8:08 AM

## 2025-04-03 NOTE — DISCHARGE INSTRUCTIONS

## 2025-04-03 NOTE — ANESTHESIA POSTPROCEDURE EVALUATION
Patient: Denis Deleon    Procedure Summary       Date: 04/03/25 Room / Location: [facility]    Anesthesia Start: 0807 Anesthesia Stop:     Procedure: COLONOSCOPY Diagnosis: Polyp of colon, unspecified part of colon, unspecified type    Scheduled Providers: Broderick Tam MD; Elsie Dutta RN Responsible Provider: MARTHA Marcos    Anesthesia Type: MAC ASA Status: 2            Anesthesia Type: MAC    Vitals Value Taken Time   BP 89/59 04/03/25 0828   Temp 36.7 °C (98.1 °F) 04/03/25 0828   Pulse 61 04/03/25 0828   Resp 13 04/03/25 0828   SpO2 94 % 04/03/25 0828       Anesthesia Post Evaluation    Patient location during evaluation: bedside  Patient participation: complete - patient participated  Level of consciousness: sedated  Pain score: 0  Pain management: adequate  Airway patency: patent  Cardiovascular status: acceptable  Respiratory status: acceptable  Hydration status: acceptable  Postoperative Nausea and Vomiting: none        No notable events documented.

## 2025-04-03 NOTE — ANESTHESIA PREPROCEDURE EVALUATION
Patient: Denis Deleon    Procedure Information       Date/Time: 04/03/25 0800    Scheduled providers: Broderick Tam MD; Elsie Dutta RN    Procedure: COLONOSCOPY    Location: [facility]            Relevant Problems   Anesthesia (within normal limits)      Cardiac (within normal limits)      Pulmonary (within normal limits)      Neuro (within normal limits)      GI (within normal limits)      /Renal (within normal limits)      Liver (within normal limits)      Endocrine (within normal limits)      Hematology (within normal limits)      Musculoskeletal (within normal limits)      HEENT (within normal limits)      ID (within normal limits)      Skin (within normal limits)      GYN (within normal limits)       Clinical information reviewed:   Tobacco  Allergies  Meds   Med Hx  Surg Hx   Fam Hx  Soc Hx        NPO Detail:  NPO/Void Status  Date of Last Liquid: 04/03/25  Time of Last Liquid: 0100  Date of Last Solid: 04/01/25  Time of Last Solid: 1800  Last Intake Type: Clear fluids         Physical Exam    Airway  Mallampati: II  TM distance: >3 FB  Neck ROM: full     Cardiovascular   Rhythm: regular  Rate: normal     Dental    Pulmonary   Breath sounds clear to auscultation     Abdominal   Abdomen: soft  Bowel sounds: normal           Anesthesia Plan    History of general anesthesia?: yes  History of complications of general anesthesia?: no    ASA 2     MAC     The patient is not a current smoker.  Patient was not previously instructed to abstain from smoking on day of procedure.  Education provided regarding risk of obstructive sleep apnea.  intravenous induction   Anesthetic plan and risks discussed with patient.    Plan discussed with CRNA.

## 2025-04-11 LAB
LABORATORY COMMENT REPORT: NORMAL
PATH REPORT.FINAL DX SPEC: NORMAL
PATH REPORT.GROSS SPEC: NORMAL
PATH REPORT.TOTAL CANCER: NORMAL

## 2025-05-04 DIAGNOSIS — N40.0 PROSTATISM: ICD-10-CM

## 2025-05-05 RX ORDER — TAMSULOSIN HYDROCHLORIDE 0.4 MG/1
0.4 CAPSULE ORAL DAILY
Qty: 90 CAPSULE | Refills: 3 | Status: SHIPPED | OUTPATIENT
Start: 2025-05-05

## 2025-07-08 ENCOUNTER — OFFICE VISIT (OUTPATIENT)
Dept: URGENT CARE | Age: 65
End: 2025-07-08
Payer: COMMERCIAL

## 2025-07-08 VITALS
RESPIRATION RATE: 16 BRPM | WEIGHT: 165 LBS | OXYGEN SATURATION: 96 % | BODY MASS INDEX: 25.9 KG/M2 | SYSTOLIC BLOOD PRESSURE: 140 MMHG | TEMPERATURE: 98 F | HEART RATE: 68 BPM | DIASTOLIC BLOOD PRESSURE: 90 MMHG | HEIGHT: 67 IN

## 2025-07-08 DIAGNOSIS — H65.03 NON-RECURRENT ACUTE SEROUS OTITIS MEDIA OF BOTH EARS: ICD-10-CM

## 2025-07-08 DIAGNOSIS — J20.9 ACUTE BRONCHITIS, UNSPECIFIED ORGANISM: ICD-10-CM

## 2025-07-08 DIAGNOSIS — J01.00 ACUTE NON-RECURRENT MAXILLARY SINUSITIS: Primary | ICD-10-CM

## 2025-07-08 PROCEDURE — 1036F TOBACCO NON-USER: CPT | Performed by: FAMILY MEDICINE

## 2025-07-08 PROCEDURE — 3008F BODY MASS INDEX DOCD: CPT | Performed by: FAMILY MEDICINE

## 2025-07-08 PROCEDURE — 99214 OFFICE O/P EST MOD 30 MIN: CPT | Performed by: FAMILY MEDICINE

## 2025-07-08 RX ORDER — PREDNISONE 20 MG/1
20 TABLET ORAL 2 TIMES DAILY
Qty: 10 TABLET | Refills: 0 | Status: SHIPPED | OUTPATIENT
Start: 2025-07-08 | End: 2025-07-13

## 2025-07-08 RX ORDER — AMOXICILLIN AND CLAVULANATE POTASSIUM 875; 125 MG/1; MG/1
1 TABLET, FILM COATED ORAL 2 TIMES DAILY
Qty: 20 TABLET | Refills: 0 | Status: SHIPPED | OUTPATIENT
Start: 2025-07-08 | End: 2025-07-18

## 2025-07-08 RX ORDER — BENZONATATE 200 MG/1
200 CAPSULE ORAL 3 TIMES DAILY PRN
Qty: 30 CAPSULE | Refills: 0 | Status: SHIPPED | OUTPATIENT
Start: 2025-07-08 | End: 2025-08-07

## 2025-07-08 ASSESSMENT — ENCOUNTER SYMPTOMS: COUGH: 1

## 2025-07-08 NOTE — PROGRESS NOTES
"Subjective   Patient ID: Denis Deleon is a 64 y.o. male. He presents today with a chief complaint of Cough (16 days/cough).    History of Present Illness  Subjective  Denis Deleon is a 64 y.o. male who presents for evaluation of symptoms of a URI. Symptoms include bilateral ear pressure/pain, cough described as productive, low grade fever, nasal congestion, and post nasal drip. Onset of symptoms was 2 weeks ago and has been gradually worsening since that time. Treatment to date: cough suppressants and nasal steroids.        Cough        Past Medical History  Allergies as of 07/08/2025 - Reviewed 07/08/2025   Allergen Reaction Noted    Gluten Diarrhea 01/01/2015    Latex Unknown 07/03/2023    Milk containing products (dairy) Diarrhea 12/28/2023       Prescriptions Prior to Admission[1]     Medical History[2]    Surgical History[3]     reports that he has never smoked. He has never used smokeless tobacco. He reports current alcohol use of about 6.0 standard drinks of alcohol per week. He reports that he does not use drugs.    Review of Systems  Review of Systems   Respiratory:  Positive for cough.                                   Objective    Vitals:    07/08/25 1913   BP: 140/90   BP Location: Right arm   Patient Position: Sitting   BP Cuff Size: Large adult   Pulse: 68   Resp: 16   Temp: 36.7 °C (98 °F)   TempSrc: Oral   SpO2: 96%   Weight: 74.8 kg (165 lb)   Height: 1.702 m (5' 7\")     No LMP for male patient.    Physical Exam  Vitals and nursing note reviewed.   Constitutional:       General: He is not in acute distress.     Appearance: Normal appearance. He is ill-appearing. He is not toxic-appearing.   HENT:      Right Ear: Ear canal and external ear normal. A middle ear effusion is present.      Left Ear: Ear canal and external ear normal. A middle ear effusion is present.      Nose: Congestion and rhinorrhea present.      Mouth/Throat:      Mouth: Mucous membranes are moist.      Pharynx: Postnasal " drip present.   Eyes:      Extraocular Movements: Extraocular movements intact.      Conjunctiva/sclera: Conjunctivae normal.      Pupils: Pupils are equal, round, and reactive to light.   Cardiovascular:      Rate and Rhythm: Normal rate and regular rhythm.      Pulses: Normal pulses.      Heart sounds: Normal heart sounds.   Pulmonary:      Effort: Pulmonary effort is normal.      Breath sounds: Examination of the right-upper field reveals rhonchi. Examination of the left-upper field reveals rhonchi. Rhonchi present. No wheezing or rales.   Musculoskeletal:      Cervical back: Normal range of motion and neck supple. No rigidity or tenderness.   Lymphadenopathy:      Cervical: No cervical adenopathy.   Neurological:      Mental Status: He is alert.         Procedures    Point of Care Test & Imaging Results from this visit  No results found for this visit on 07/08/25.   Imaging  No results found.    Cardiology, Vascular, and Other Imaging  No other imaging results found for the past 2 days      Diagnostic study results (if any) were reviewed by Pia Moscoso MD.    Assessment/Plan   Allergies, medications, history, and pertinent labs/EKGs/Imaging reviewed by Pia Moscoso MD.     Medical Decision Making      Orders and Diagnoses  There are no diagnoses linked to this encounter.    Medical Admin Record      Patient disposition: Home    Electronically signed by Pia Moscoso MD  7:21 PM           [1] (Not in a hospital admission)  [2]   Past Medical History:  Diagnosis Date    Non-celiac gluten sensitivity     Gluten intolerance   [3]   Past Surgical History:  Procedure Laterality Date    FOOT SURGERY      KNEE SURGERY

## 2025-07-08 NOTE — PATIENT INSTRUCTIONS
Antibiotics as directed; take with food  Prednisone as directed; take with food  Benzonatate as needed  Follow up with new or worsening symptoms

## 2025-08-19 ENCOUNTER — APPOINTMENT (OUTPATIENT)
Dept: DERMATOLOGY | Facility: CLINIC | Age: 65
End: 2025-08-19
Payer: COMMERCIAL